# Patient Record
Sex: MALE | Race: WHITE
[De-identification: names, ages, dates, MRNs, and addresses within clinical notes are randomized per-mention and may not be internally consistent; named-entity substitution may affect disease eponyms.]

---

## 2021-02-16 ENCOUNTER — HOSPITAL ENCOUNTER (EMERGENCY)
Dept: HOSPITAL 41 - JD.ED | Age: 36
Discharge: HOME | End: 2021-02-16
Payer: SELF-PAY

## 2021-02-16 DIAGNOSIS — G47.00: ICD-10-CM

## 2021-02-16 DIAGNOSIS — F41.9: Primary | ICD-10-CM

## 2021-02-16 DIAGNOSIS — Z79.899: ICD-10-CM

## 2021-02-16 PROCEDURE — 80053 COMPREHEN METABOLIC PANEL: CPT

## 2021-02-16 PROCEDURE — 96374 THER/PROPH/DIAG INJ IV PUSH: CPT

## 2021-02-16 PROCEDURE — 93005 ELECTROCARDIOGRAM TRACING: CPT

## 2021-02-16 PROCEDURE — 84443 ASSAY THYROID STIM HORMONE: CPT

## 2021-02-16 PROCEDURE — 99284 EMERGENCY DEPT VISIT MOD MDM: CPT

## 2021-02-16 PROCEDURE — 36415 COLL VENOUS BLD VENIPUNCTURE: CPT

## 2021-02-16 PROCEDURE — 85025 COMPLETE CBC W/AUTO DIFF WBC: CPT

## 2021-02-16 NOTE — EDM.PDOC
ED HPI GENERAL MEDICAL PROBLEM





- General


Chief Complaint: Cardiovascular Problem


Stated Complaint: ANXIETY/FAST HEART RATE


Time Seen by Provider: 02/16/21 09:15


Source of Information: Reports: Patient


History Limitations: Reports: No Limitations





- History of Present Illness


INITIAL COMMENTS - FREE TEXT/NARRATIVE: 


35-year-old male attends the ED after experiencing a significant anxiety 

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  States he had a 

bit yesterday but today became uncontrolled and he had to leave the classroom.  

Did help to walk around outside for a while but he still felt his heart racing 

heart in his chest which made him come to the ED.  No true central chest pain.  

Mild lightheadedness and dizziness.  Valle sense of doom.  He reports he has 

not been sleeping very well for the last several weeks due to having to move 

here for a job.  Had to leave his family at home.  He denies any excessive 

alcohol use and certainly knows drug or street drug use.  Using any energy 

drinks.  Plan routine labs to be done chest ECG.





Onset: Today, Sudden


Onset Date: 02/16/21


Onset Time: 08:30


Duration: Minutes:


Location: Reports: Generalized (Sense of heart racing uneasiness with no 

pressure in his throat or sense of doom.  He has good insight into this being 

anxiety.)


Quality: Reports: Other


Severity: Moderate (Neurolysed anxiety)


Improves with: Reports: None


Worsens with: Reports: None


Context: Denies: Activity, Exercise, Sick Contact, Trauma


Associated Symptoms: Reports: Loss of Appetite, Malaise, Other.  Denies: No 

Other Symptoms, Chest Pain, cough w sputum, Diaphoresis, Fever/Chills, 

Headaches, Nausea/Vomiting, Rash, Seizure, Shortness of Breath, Syncope


Treatments PTA: Reports: Other (see below) (Disrupted sleep pattern for the last

several weeks.  None.)





- Related Data


                                    Allergies











Allergy/AdvReac Type Severity Reaction Status Date / Time


 


No Known Allergies Allergy   Verified 02/16/21 09:17











Home Meds: 


                                    Home Meds





LORazepam [Ativan] 1 mg PO Q8H #6 tab 02/16/21 [Rx]


Valsartan/Hydrochlorothiazide [Valsartan-Hctz 160-12.5 mg Tab] 0 mg PO DAILY 

02/16/21 [History]


clonazePAM [Clonazepam] 1 mg PO DAILY #10 tablet 02/16/21 [Rx]











Social & Family History





- Living Situation & Occupation


Living situation: Reports: 


Occupation: Unemployed





ED ROS GENERAL





- Review of Systems


Review Of Systems: See Below


Constitutional: Reports: Malaise, Fatigue, Decreased Appetite.  Denies: Fever, 

Chills, Weight Loss


HEENT: Reports: Glasses


Respiratory: Reports: No Symptoms


Cardiovascular: Reports: Blood Pressure Problem (Elling of heart racing 

intermittently.), Other


Endocrine: Reports: Fatigue


GI/Abdominal: Reports: Nausea


: Reports: No Symptoms


Musculoskeletal: Reports: No Symptoms


Skin: Reports: No Symptoms


Neurological: Reports: Dizziness (Dizziness with today's attack of anxiety), 

Paresthesia (Mostly left upper extremity), Weakness.  Denies: Confusion, Pre-

Existing Deficit, Tremors, Trouble Speaking ( gradually resolving.), Difficulty 

Walking


Psychiatric: Reports: Anxiety (Lysed weakness)


Hematologic/Lymphatic: Reports: No Symptoms


Immunologic: Reports: No Symptoms





ED EXAM, GENERAL





- Physical Exam


Exam: See Below


Exam Limited By: No Limitations


General Appearance: Alert, WD/WN, Mild Distress, Other (Temperature is 36.6 

degrees.  Heart rate 101 and sinus.  Respiratory is 18 with O2 sats of 99% room 

air BP elevated 173 110.)


Eye Exam: Bilateral Eye: Normal Inspection, PERRL (No blepharal pallor or 

scleral icterus.)


Throat/Mouth: Normal Inspection, Normal Lips, Normal Teeth, Normal Oropharynx


Head: Atraumatic, Normocephalic


Neck: Normal Inspection, Supple, Non-Tender, Full Range of Motion.  No: Carotid 

Bruit, Lymphadenopathy (L), Lymphadenopathy (R)


Respiratory/Chest: No Respiratory Distress, Lungs Clear, Normal Breath Sounds, 

No Accessory Muscle Use, Chest Non-Tender.  No: Rhonchi, Wheezing


Cardiovascular: Normal Peripheral Pulses, No Edema, No Gallop (Tachycardia at 

rest.), No Murmur, No Rub, Tachycardia


Peripheral Pulses: 3+: Carotid (L), Carotid (R), Posterior Tibial (L), Posterior

 Tibial (R), Dorsalis Pedis (L), Dorsalis Pedis (R)


GI/Abdominal: Normal Bowel Sounds, Soft, Non-Tender, No Organomegaly, No 

Abnormal Bruit, No Mass, Pelvis Stable


 (Male) Exam: No Hernia


Back Exam: Normal Inspection, Full Range of Motion.  No: CVA Tenderness (L), CVA

 Tenderness (R)


Extremities: Normal Inspection, Normal Range of Motion, Non-Tender, No Pedal 

Edema


Neurological: Alert, Oriented, CN II-XII Intact, Normal Cognition, Normal Gait


Psychiatric: Normal Affect, Normal Mood, Anxious (Anxious.  He has good insight 

into the etiology of his complex anxiety symptoms.)


Skin Exam: Warm, Dry, Intact, Normal Color, No Rash


  ** #1 Interpretation


EKG Date: 02/16/21


Time: 09:25


Rhythm: Other (Sinus arrhythmia with rate of 75 to 96/min)


Rate (Beats/Min): 85


Axis: Normal


P-Wave: Enlarged (Consider left atrial hypertrophy)


QRS: Other (Initial poor R wave progression there is a left ventricular 

hypertrophy pattern)


ST-T: Other (Diffuse early repolarization pattern)


QT: Normal


EKG Interpretation Comments: 





Abnormal ECG





Course





- Vital Signs


Last Recorded V/S: 


                                Last Vital Signs











Temp  36.6 C   02/16/21 09:15


 


Pulse  79   02/16/21 10:00


 


Resp  16   02/16/21 10:00


 


BP  147/100 H  02/16/21 10:00


 


Pulse Ox  96   02/16/21 10:00














- Orders/Labs/Meds


Orders: 


                               Active Orders 24 hr











 Category Date Time Status


 


 Dextrose 5%-0.9% NaCl [Dextrose 5%-Normal Saline] 1,000 Med  02/16/21 09:30 

Active





 ml   





 IV ASDIRECTED   








                                Medication Orders





Dextrose/Sodium Chloride (Dextrose 5%-Normal Saline)  1,000 mls @ 500 mls/hr IV 

ASDIRECTED NEELIMA


   Last Admin: 02/16/21 09:42  Dose: 500 mls/hr


   Documented by: VASQUEZ








Labs: 


                                Laboratory Tests











  02/16/21 02/16/21 Range/Units





  09:40 09:40 


 


WBC  10.40 H   (4.23-9.07)  K/mm3


 


RBC  5.09   (4.63-6.08)  M/mm3


 


Hgb  15.9   (13.7-17.5)  gm/dl


 


Hct  45.3   (40.1-51.0)  %


 


MCV  89.0   (79.0-92.2)  fl


 


MCH  31.2   (25.7-32.2)  pg


 


MCHC  35.1   (32.2-35.5)  g/dl


 


RDW Std Deviation  40.3   (35.1-43.9)  fL


 


Plt Count  239   (163-337)  K/mm3


 


MPV  9.6   (9.4-12.3)  fl


 


Neut % (Auto)  71.4 H   (34.0-67.9)  %


 


Lymph % (Auto)  21.6 L   (21.8-53.1)  %


 


Mono % (Auto)  6.5   (5.3-12.2)  %


 


Eos % (Auto)  0.1 L   (0.8-7.0)  


 


Baso % (Auto)  0.1   (0.1-1.2)  %


 


Neut # (Auto)  7.42 H   (1.78-5.38)  K/mm3


 


Lymph # (Auto)  2.25   (1.32-3.57)  K/mm3


 


Mono # (Auto)  0.68   (0.30-0.82)  K/mm3


 


Eos # (Auto)  0.01 L   (0.04-0.54)  K/mm3


 


Baso # (Auto)  0.01   (0.01-0.08)  K/mm3


 


Sodium   138  (136-145)  mEq/L


 


Potassium   3.2 L  (3.5-5.1)  mEq/L


 


Chloride   102  ()  mEq/L


 


Carbon Dioxide   21  (21-32)  mEq/L


 


Anion Gap   18.2 H  (5-15)  


 


BUN   13  (7-18)  mg/dL


 


Creatinine   1.0  (0.7-1.3)  mg/dL


 


Est Cr Clr Drug Dosing   111.01  mL/min


 


Estimated GFR (MDRD)   > 60  (>60)  mL/min


 


BUN/Creatinine Ratio   13.0 L  (14-18)  


 


Glucose   124 H  ()  mg/dL


 


Calcium   9.7  (8.5-10.1)  mg/dL


 


Total Bilirubin   0.8  (0.2-1.0)  mg/dL


 


AST   32  (15-37)  U/L


 


ALT   50  (16-63)  U/L


 


Alkaline Phosphatase   59  ()  U/L


 


Total Protein   7.5  (6.4-8.2)  g/dl


 


Albumin   3.8  (3.4-5.0)  g/dl


 


Globulin   3.7  gm/dL


 


Albumin/Globulin Ratio   1.0  (1-2)  


 


TSH 3rd Generation   1.561  (0.358-3.74)  uIU/mL











Meds: 


Medications











Generic Name Dose Route Start Last Admin





  Trade Name Maynor  PRN Reason Stop Dose Admin


 


Dextrose/Sodium Chloride  1,000 mls @ 500 mls/hr  02/16/21 09:30  02/16/21 09:42





  Dextrose 5%-Normal Saline  IV   500 mls/hr





  ASDIRECTED NEELIMA   Administration














Discontinued Medications














Generic Name Dose Route Start Last Admin





  Trade Name Maynor  PRN Reason Stop Dose Admin


 


Lorazepam  1 mg  02/16/21 09:23  02/16/21 09:42





  Ativan  IV  02/16/21 09:24  1 mg





  ONETIME ONE   Administration














- Radiology Interpretation


Free Text/Narrative:: 


35-year-old male attends the ED feeling extremely anxious.  Felt mildly anxious 

yesterday but much worse when he was in class this morning for a new job.  Came 

on suddenly with no good reason.  Felt his heart racing in his chest with mild 

dyspnea.  No pressure in his throat or neck.  Feels a bit better now since he 

got up and removed himself from the classroom.  Plan he will receive IV fluids 

D5 normal saline at 150 mils per hour.  We will give Ativan 1 mg IV.








- Re-Assessments/Exams


Free Text/Narrative Re-Assessment/Exam: 





02/16/21 11:09 labs are now back.  They reveal a normal white count at 10.40 

with 71.4% neutrophils.  Hemoglobin is 15.9 with hematocrit of 45.3.  Platelet 

count is 239,000.  Sodium 138 with a potassium slightly low at 3.2.  Chloride 

102 with a bicarb of 21.  Anion gap is elevated at 18.2.  BUN is 13 with a 

creatinine of 1.0 and a GFR greater than 60.  Glucose is 124.  Calcium is 9.7 

total protein is 7.5 with an albumin fraction of 3.8 TSH is normal at 1.56.





02/16/21 11:17 admits he has not had much to eat or drink in the last couple of 

days and therefore the elevated anion gap.  Since has been sleeping very poorly 

for the last 2 to 3 weeks this may be contributing to his anxiety.  I am going 

to prescribe clonazepam 1 mg at bedtime for the next 10 days.  I will prescribe 

Ativan 1 mg to have on hand in case he develops a another anxiety attack.  8 

tablets will be provided. They will  only be used if he develops another panic 

attack or anxiety attack.








Departure





- Departure


Time of Disposition: 11:18


Disposition: Home, Self-Care 01


Reason for Transfer *Q: Other


Condition: Fair


Clinical Impression: 


 Anxiety attack





Insomnia disorder


Qualifiers:


 Insomnia type: unspecified Qualified Code(s): G47.00 - Insomnia, unspecified





Prescriptions: 


LORazepam [Ativan] 1 mg PO Q8H #6 tab


clonazePAM [Clonazepam] 1 mg PO DAILY #10 tablet


Instructions:  Generalized Anxiety Disorder, Adult, Insomnia


Referrals: 


PCP,None [Primary Care Provider] - 


Forms:  ED Department Discharge, ED Return to Work/School Form


Additional Instructions: 


Evaluation in the emergency room today in regards to acute anxiety reaction that

occurred while you were in class this morning.  You are under good deal of 

stress with starting a new job with disrupted sleep pattern for the last several

weeks.  We make chemicals that talk to brain cells called neuro peptides during 

the night when we are sleeping.  Disrupted sleep pattern can be the cause of 

many psychological illnesses particular a generalized anxiety disorder.  You 

were treated with 30 mg of Ativan while in the ED which brought your symptoms 

are good control.  Lab test revealed that you were a little bit on the 

dehydrated side and therefore I would encourage her to drink plenty of fluids 

today such as Gatorade or Powerade to provide rehydration.  Suggest using 

clonazepam 1 mg at bedtime as needed to help sleep over the next 10 to 12 days 

to ensure a good night sleep.  Ativan tablets 1 mg strength to have on hand in 

case you develop further anxiety episode.  It usually will relieve anxiety 

within about 30 to 40 minutes.  Return to the ED if symptoms are not controlled 

with medication as above.  If symptoms persist longer than 3 weeks then 

alternative medications should be considered as we have other medicines that we 

can use on a daily basis to prevent anxiety attacks from occurring.





Sepsis Event Note (ED)





- Focused Exam


Vital Signs: 


                                   Vital Signs











  Temp Pulse Resp BP Pulse Ox


 


 02/16/21 10:00   79  16  147/100 H  96


 


 02/16/21 09:45   101 H  18  152/100 H  97


 


 02/16/21 09:15  36.6 C  101 H  18  173/110 H  99














- My Orders


Last 24 Hours: 


My Active Orders





02/16/21 09:30


Dextrose 5%-0.9% NaCl [Dextrose 5%-Normal Saline] 1,000 ml IV ASDIRECTED 














- Assessment/Plan


Last 24 Hours: 


My Active Orders





02/16/21 09:30


Dextrose 5%-0.9% NaCl [Dextrose 5%-Normal Saline] 1,000 ml IV ASDIRECTED